# Patient Record
Sex: FEMALE | Race: BLACK OR AFRICAN AMERICAN | NOT HISPANIC OR LATINO
[De-identification: names, ages, dates, MRNs, and addresses within clinical notes are randomized per-mention and may not be internally consistent; named-entity substitution may affect disease eponyms.]

---

## 2024-09-23 PROBLEM — Z00.00 ENCOUNTER FOR PREVENTIVE HEALTH EXAMINATION: Status: ACTIVE | Noted: 2024-09-23

## 2024-09-24 ENCOUNTER — APPOINTMENT (OUTPATIENT)
Dept: OBGYN | Facility: CLINIC | Age: 34
End: 2024-09-24
Payer: COMMERCIAL

## 2024-09-24 VITALS
WEIGHT: 137 LBS | BODY MASS INDEX: 24.27 KG/M2 | HEIGHT: 63 IN | DIASTOLIC BLOOD PRESSURE: 80 MMHG | SYSTOLIC BLOOD PRESSURE: 121 MMHG | HEART RATE: 91 BPM

## 2024-09-24 DIAGNOSIS — Z78.9 OTHER SPECIFIED HEALTH STATUS: ICD-10-CM

## 2024-09-24 DIAGNOSIS — F12.90 CANNABIS USE, UNSPECIFIED, UNCOMPLICATED: ICD-10-CM

## 2024-09-24 DIAGNOSIS — N76.4 ABSCESS OF VULVA: ICD-10-CM

## 2024-09-24 DIAGNOSIS — Z01.411 ENCOUNTER FOR GYNECOLOGICAL EXAMINATION (GENERAL) (ROUTINE) WITH ABNORMAL FINDINGS: ICD-10-CM

## 2024-09-24 PROCEDURE — 99385 PREV VISIT NEW AGE 18-39: CPT

## 2024-09-24 RX ORDER — FLUCONAZOLE 150 MG/1
150 TABLET ORAL
Qty: 4 | Refills: 0 | Status: ACTIVE | COMMUNITY
Start: 2024-09-24 | End: 1900-01-01

## 2024-09-24 RX ORDER — CEPHALEXIN 500 MG/1
500 TABLET ORAL
Qty: 28 | Refills: 0 | Status: ACTIVE | COMMUNITY
Start: 2024-09-24 | End: 1900-01-01

## 2024-09-24 NOTE — PHYSICAL EXAM
[Chaperone Present] : A chaperone was present in the examining room during all aspects of the physical examination [Oriented x3] : oriented x3 [Examination Of The Breasts] : a normal appearance [No Discharge] : no discharge [No Masses] : no breast masses were palpable [Labia Majora] : normal [Labia Minora] : normal [No Bleeding] : There was no active vaginal bleeding [Normal] : normal [Normal Position] : in a normal position [Uterine Adnexae] : normal [FreeTextEntry3] : Thyroid is non-enlarged, nontender, with no palpable nodules or goiter. No lymphadenopathy. [FreeTextEntry7] : Soft. Nondistended. Nontender. No rebound or guarding. There are no palpable masses. [FreeTextEntry2] : 2 mm papule at the mid-outer aspect of the left labia majora.  HSV culture obtained.  3 cm enlargement at the superior aspect of the left labia majora which is fluctuant. [FreeTextEntry4] : No vaginal discharge, blood, or any lesions noted within the vaginal vault. [FreeTextEntry5] : A speculum was inserted without any difficulty. The cervix was normal in appearance. Pap smear was obtained.  No cervical motion tenderness. [FreeTextEntry6] : Bimanual examination was notable for a normal, nontender uterus. There were no palpable adnexal masses or adnexal tenderness.

## 2024-09-24 NOTE — HISTORY OF PRESENT ILLNESS
[Patient reported PAP Smear was normal] : Patient reported PAP Smear was normal [Y] : Patient is sexually active [N] : Patient denies prior pregnancies [TextBox_4] : 34-year-old  0 LMP: , presents for an annual examination.  The patient presents complaining of a left labial pain for lesion.  She was initially evaluated at [facility] and was prescribed an antifungal medication. [PapSmeardate] : 2022 [LMPDate] : 09/16/2024 [MensesFreq] : 28 [PGHxTotal] : 0

## 2024-09-24 NOTE — PLAN
[FreeTextEntry1] : Wellness exam. Normal physical examination. Recommendations: Dental and dermatological examination. Status post Gardasil vaccination. Declines contraception.  Left vulvar healing lesion.  HSV culture obtained.  Fluctuant and edematous enlargement of the superior aspect of the left labia majora.  Question abscess.  Will prescribe Keflex and Diflucan for prophylaxis.  Follow-up in 10 days to evaluate for interval improvement.  The patient was advised not to wax. Discussed proper hygiene.   Discussed proper nutrition and physical exercise. Reviewed age-appropriate vaccinations.  Family history significant for maternal great aunt and maternal great grandmother with history of breast cancer.  Today patient's mother is close genetic testing which was negative.  Recommend baseline screening mammography 37.

## 2024-09-25 LAB
HPV HIGH+LOW RISK DNA PNL CVX: NOT DETECTED
HSV+VZV DNA SPEC QL NAA+PROBE: NOT DETECTED
SPECIMEN SOURCE: NORMAL

## 2024-09-26 ENCOUNTER — TRANSCRIPTION ENCOUNTER (OUTPATIENT)
Age: 34
End: 2024-09-26

## 2024-10-01 LAB — CYTOLOGY CVX/VAG DOC THIN PREP: ABNORMAL

## 2024-10-09 ENCOUNTER — APPOINTMENT (OUTPATIENT)
Dept: OBGYN | Facility: CLINIC | Age: 34
End: 2024-10-09

## 2024-10-09 VITALS
BODY MASS INDEX: 24.27 KG/M2 | WEIGHT: 137 LBS | SYSTOLIC BLOOD PRESSURE: 112 MMHG | HEART RATE: 80 BPM | DIASTOLIC BLOOD PRESSURE: 78 MMHG | HEIGHT: 63 IN

## 2024-10-09 DIAGNOSIS — N90.89 OTHER SPECIFIED NONINFLAMMATORY DISORDERS OF VULVA AND PERINEUM: ICD-10-CM

## 2024-10-09 PROCEDURE — 99213 OFFICE O/P EST LOW 20 MIN: CPT

## 2025-08-15 ENCOUNTER — APPOINTMENT (OUTPATIENT)
Age: 35
End: 2025-08-15

## 2025-08-15 VITALS
BODY MASS INDEX: 24.84 KG/M2 | HEIGHT: 62 IN | HEART RATE: 67 BPM | SYSTOLIC BLOOD PRESSURE: 118 MMHG | DIASTOLIC BLOOD PRESSURE: 78 MMHG | WEIGHT: 135 LBS

## 2025-08-15 DIAGNOSIS — N80.9 ENDOMETRIOSIS, UNSPECIFIED: ICD-10-CM

## 2025-08-15 DIAGNOSIS — D36.9 BENIGN NEOPLASM, UNSPECIFIED SITE: ICD-10-CM

## 2025-08-15 DIAGNOSIS — N83.519 TORSION OF OVARY AND OVARIAN PEDICLE, UNSPECIFIED SIDE: ICD-10-CM

## 2025-08-15 PROCEDURE — 99214 OFFICE O/P EST MOD 30 MIN: CPT

## 2025-08-15 PROCEDURE — 99459 PELVIC EXAMINATION: CPT
